# Patient Record
(demographics unavailable — no encounter records)

---

## 2025-02-25 NOTE — ASSESSMENT
[FreeTextEntry1] : Normal exam Will get all labs; Also Mammogram Bone density  Further plans after review of studies  Will likely need treatment for osteoporosis